# Patient Record
Sex: FEMALE | Race: BLACK OR AFRICAN AMERICAN | NOT HISPANIC OR LATINO | URBAN - METROPOLITAN AREA
[De-identification: names, ages, dates, MRNs, and addresses within clinical notes are randomized per-mention and may not be internally consistent; named-entity substitution may affect disease eponyms.]

---

## 2022-06-22 ENCOUNTER — EMERGENCY (EMERGENCY)
Facility: HOSPITAL | Age: 47
LOS: 0 days | Discharge: HOME | End: 2022-06-22
Attending: STUDENT IN AN ORGANIZED HEALTH CARE EDUCATION/TRAINING PROGRAM | Admitting: STUDENT IN AN ORGANIZED HEALTH CARE EDUCATION/TRAINING PROGRAM
Payer: OTHER MISCELLANEOUS

## 2022-06-22 VITALS
DIASTOLIC BLOOD PRESSURE: 63 MMHG | RESPIRATION RATE: 16 BRPM | TEMPERATURE: 97 F | SYSTOLIC BLOOD PRESSURE: 136 MMHG | WEIGHT: 229.94 LBS | HEART RATE: 64 BPM | OXYGEN SATURATION: 100 %

## 2022-06-22 DIAGNOSIS — M25.561 PAIN IN RIGHT KNEE: ICD-10-CM

## 2022-06-22 DIAGNOSIS — S82.142A DISPLACED BICONDYLAR FRACTURE OF LEFT TIBIA, INITIAL ENCOUNTER FOR CLOSED FRACTURE: ICD-10-CM

## 2022-06-22 DIAGNOSIS — Z88.6 ALLERGY STATUS TO ANALGESIC AGENT: ICD-10-CM
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
## 2022-06-22 DIAGNOSIS — M25.562 PAIN IN LEFT KNEE: ICD-10-CM
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
## 2022-06-22 PROCEDURE — 73700 CT LOWER EXTREMITY W/O DYE: CPT | Mod: 26,50,MA

## 2022-06-22 PROCEDURE — 99285 EMERGENCY DEPT VISIT HI MDM: CPT

## 2022-06-22 PROCEDURE — 73562 X-RAY EXAM OF KNEE 3: CPT | Mod: 26,LT

## 2022-06-22 PROCEDURE — 73590 X-RAY EXAM OF LOWER LEG: CPT | Mod: 26,LT

## 2022-06-22 NOTE — ED PROVIDER NOTE - ATTENDING APP SHARED VISIT CONTRIBUTION OF CARE
47-year-old female with no past medical history presents with knee pain.  Patient fell yesterday with mechanical fall and landed on her knees.  Patient went to urgent care and was informed that she had a fracture in both of her knees and come to the ER for evaluation.  Patient denies hitting any other part of her body.  Patient denies any numbness tingling or any other medical complaints.     VITAL SIGNS: I have reviewed nursing notes and confirm.  CONSTITUTIONAL: non-toxic, well appearing  SKIN: no rash, no petechiae.  EYES: EOMI, pink conjunctiva, anicteric  ENT: tongue midline, no exudates, MMM  NECK: Supple; no meningismus, no JVD  CARD: RRR, no murmurs, equal radial pulses bilaterally 2+  RESP: CTAB, no respiratory distress  ABD: Soft, non-tender, non-distended, no peritoneal signs, no HSM, no CVA tenderness  EXT: Normal ROM x4. No edema. No calves tenderness  NEURO: Alert, oriented. CN2-12 intact, equal strength bilaterally, nl gait.  PSYCH: Cooperative, appropriate. 47-year-old female with no past medical history presents with knee pain.  Patient fell yesterday with mechanical fall and landed on her knees.  Patient went to urgent care and was informed that she had a fracture in both of her knees and come to the ER for evaluation.  Patient denies hitting any other part of her body.  Patient denies any numbness tingling or any other medical complaints.     VITAL SIGNS: I have reviewed nursing notes and confirm.  CONSTITUTIONAL: non-toxic, well appearing  SKIN: no rash, no petechiae.  EYES: EOMI, pink conjunctiva, anicteric  ENT: tongue midline, no exudates, MMM  NECK: Supple; no meningismus, no JVD  RESP: no respiratory distress  EXT: Normal ROM x4. No edema. No calves tenderness  there is a pain to the knee on the L, sensation intact in the lower extremities, DP +2 bilaterally, pt unable to bend knee due to pain.   NEURO: Alert, oriented x3.     a/p  47 yr old f that presents with bilateral knee pain   -imaging  -pain management  -reassess  -dispo pending

## 2022-06-22 NOTE — ED PROVIDER NOTE - NSFOLLOWUPINSTRUCTIONS_ED_ALL_ED_FT
Fracture    A fracture is a break in one of your bones. This can occur from a variety of injuries especially traumatic ones. Symptoms include pain, bruising, or swelling.  A splint might have been applied by your health care provider. Make sure to keep it dry and follow up with an orthopedist as instructed.    SEEK IMMEDIATE MEDICAL CARE IF YOU HAVE THE FOLLOWING SYMPTOMS: numbness, tingling, pain, or weakness in any part of your body distal to the fracture.

## 2022-06-22 NOTE — ED PROVIDER NOTE - OBJECTIVE STATEMENT
pt sent to ED for further eval of poss tib plateau fx. pt reports falling yesterday at work, landing on both knees. pain to both, L>R. went to UC and had xrays which showed L sided abnl ?fx, sent for further eval. pain is sharp, nonradiating, moderate. denies exacerbating or relieving factors. Denies fever/chill/HA/dizziness/chest pain/palpitation/sob/abd pain/n/v/d/ black stool/bloody stool/urinary sxs

## 2022-06-22 NOTE — ED PROVIDER NOTE - PATIENT PORTAL LINK FT
You can access the FollowMyHealth Patient Portal offered by [facility] by registering at the following website: [email or web address]. By joining [email or web address]’s FollowMyHealth portal, you will also be able to view your health information using other applications (apps) compatible with our system.

## 2022-06-22 NOTE — ED ADULT NURSE NOTE - OBJECTIVE STATEMENT
pt sent in er for knee xrays after a fall at work yesterday. S/p fall at work yesterday was told she has a L knee fx and right knee injury that needs more imaging, sent by urgent care.

## 2022-06-22 NOTE — ED ADULT NURSE NOTE - CHIEF COMPLAINT QUOTE
S/p fall at work yesterday was told she has a L knee fx and right knee injury that needs more imaging, sent by urgent care.

## 2022-06-22 NOTE — ED PROVIDER NOTE - PHYSICAL EXAMINATION
CONSTITUTIONAL: Well-appearing; well-nourished; in no apparent distress.   NECK: Supple; non-tender; no cervical lymphadenopathy.  CARDIOVASCULAR: Normal S1, S2; no murmurs, rubs, or gallops.   RESPIRATORY: Normal chest excursion with respiration; breath sounds clear and equal bilaterally; no wheezes, rhonchi, or rales.  GI/: non-distended; non-tender; no palpable organomegaly.   MS: No evidence of trauma or deformity. b/l knee ttp, pain with ROM L knee and swelling L knee; otherwise non-tender to palpation and normal ROM in all other joints/extremities; distal pulses are normal.   SKIN: Normal for age and race; warm; dry; good turgor; no apparent lesions or exudate.   NEURO/PSYCH: A & O x 4; grossly unremarkable. mood and manner are appropriate.

## 2022-06-22 NOTE — ED PROVIDER NOTE - CLINICAL SUMMARY MEDICAL DECISION MAKING FREE TEXT BOX
47 yr old f that presents with bilateral knee pain   -imaging  -pain management  -pt evaluated by orthopedics, recommend knee immobilizer. pt given ortho follow up and strict return precautions.

## 2022-06-22 NOTE — ED PROVIDER NOTE - NS ED ATTENDING STATEMENT MOD
This was a shared visit with the DION. I reviewed and verified the documentation and independently performed the documented:

## 2022-06-22 NOTE — ED ADULT NURSE NOTE - NSIMPLEMENTINTERV_GEN_ALL_ED
Implemented All Fall Risk Interventions:  [place] to call system. Call bell, personal items and telephone within reach. Instruct patient to call for assistance. Room bathroom lighting operational. Non-slip footwear when patient is off stretcher. Physically safe environment: no spills, clutter or unnecessary equipment. Stretcher in lowest position, wheels locked, appropriate side rails in place. Provide visual cue, wrist band, yellow gown, etc. Monitor gait and stability. Monitor for mental status changes and reorient to person, place, and time. Review medications for side effects contributing to fall risk. Reinforce activity limits and safety measures with patient and family.

## 2022-06-22 NOTE — CONSULT NOTE ADULT - SUBJECTIVE AND OBJECTIVE BOX
Orthopaedic Surgery Consult Note      46yo Female presents to [facility] ED with Left tibial plateau sustained yesterday. Patient states yesterday sustained mechanical fall from ground level height with direct impact to bilateral knees.  Patient states she was able to ambulate on bilateral LE s/p injury. Denies headstrike/LOC  Prior to injury, patient full ambulator, patient works for [employer].  Patient denies use of blood thinners.    PMH/PSH  Denies    Home Medications:  Denies      Allergies  Motrin (Unknown)        T(C): 36.1 (06-22-22 @ 17:14), Max: 36.1 (06-22-22 @ 17:14)  HR: 64 (06-22-22 @ 17:14) (64 - 64)  BP: 136/63 (06-22-22 @ 17:14) (136/63 - 136/63)  RR: 16 (06-22-22 @ 17:14) (16 - 16)  SpO2: 100% (06-22-22 @ 17:14) (100% - 100%)    P/E:  AOx3, NAD  Non-labored breathing        LLE  Skin intact  Mild swelling/effusion noted on the medial aspect of L knee   No gross deformity/laceration/abrasion noted  ROM 10-40, limited by pain  Maximal TTP medial aspect L knee  Compartments soft and compressible, no pain w/ passive stretch of digits  SILT sp/dp/t/sural/saph  Firing ta/ehl/fhl/gs  DP pulse 2+, cap refill <2    Imaging:  L knee/tibia; Bilateral LE CT scan demonstrate L minimally displaced tibial plateau fracture.    A/P:  46yo Female w/ left tibial plateau fracture    Weight bearing: NWB LLE  Application of ace/KI  Pain control  Ice/elevation  Crutch training  Follow-up w/ Dr. Hwang, please call [phone] to schedule an appointment.  Prior to discharge, patient requires completion imaging (L Knee, L Tibia/fibula, L ankle)       Orthopaedic Surgery Consult Note      46yo Female presents to [facility] ED as she was told she sustained Left tibial plateau sustained yesterday with imaging acquired at [facility]. Patient states yesterday sustained mechanical fall from ground level height with direct impact to bilateral knees. Patient states she was able to ambulate on bilateral LE s/p injury. Denies headstrike/LOC. Prior to injury, patient full ambulator, patient works for [employer]. Patient denies use of blood thinners.    PMH/PSH  Denies    Home Medications:  Denies      Allergies  Motrin (Unknown)        T(C): 36.1 (06-22-22 @ 17:14), Max: 36.1 (06-22-22 @ 17:14)  HR: 64 (06-22-22 @ 17:14) (64 - 64)  BP: 136/63 (06-22-22 @ 17:14) (136/63 - 136/63)  RR: 16 (06-22-22 @ 17:14) (16 - 16)  SpO2: 100% (06-22-22 @ 17:14) (100% - 100%)    P/E:  AOx3, NAD  Non-labored breathing        LLE  Skin intact  Mild swelling/effusion noted on the medial aspect of L knee   No gross deformity/laceration/abrasion noted  ROM 10-40, limited by pain  Maximal TTP medial aspect L knee  Compartments soft and compressible, no pain w/ passive stretch of digits  SILT sp/dp/t/sural/saph  Firing ta/ehl/fhl/gs  DP pulse 2+, cap refill <2    Imaging:   CT scan demonstrate L minimally displaced tibial spinefracture.    A/P:  46yo Female w/ left tibial spine fracture    Weight bearing: NWB LLE  Application of ace/KI  Pain control  Ice/elevation  Crutch training  Follow-up w/ Dr. Hwang, please call [phone] to schedule an appointment.  Prior to discharge, patient requires completion imaging (L Knee, L Tibia/fibula, L ankle)

## 2022-06-22 NOTE — ED PROVIDER NOTE - CARE PROVIDER_API CALL
Beau Hwang)  Orthopaedic Surgery  [address] [place] [address]  Phone: [phone]  Fax: [phone]  Follow Up Time:

## 2022-06-27 PROBLEM — Z00.00 ENCOUNTER FOR PREVENTIVE HEALTH EXAMINATION: Status: ACTIVE | Noted: 2022-06-27

## 2022-06-28 ENCOUNTER — APPOINTMENT (OUTPATIENT)
Dept: ORTHOPEDIC SURGERY | Facility: CLINIC | Age: 47
End: 2022-06-28

## 2022-06-28 VITALS — WEIGHT: 210 LBS | HEIGHT: 66 IN | BODY MASS INDEX: 33.75 KG/M2

## 2022-06-28 DIAGNOSIS — Z78.9 OTHER SPECIFIED HEALTH STATUS: ICD-10-CM

## 2022-06-28 PROCEDURE — 99204 OFFICE O/P NEW MOD 45 MIN: CPT | Mod: PA

## 2022-06-28 PROCEDURE — 99072 ADDL SUPL MATRL&STAF TM PHE: CPT

## 2022-06-28 NOTE — IMAGING
[de-identified] : On examination of the right and left knee, patient has mild generalized swelling over the knees bilaterally.\par No ecchymosis or erythema notice.\par Patient has significant pain when palpating over the anterior aspect of the knee bilaterally.\par Patient is able to do actively straight leg raise bilaterally with severe pain.\par Patient is able to flex her knee about 45° bilaterally.\par No signs of instability to valgus and varus stress and to anterior drawer bilaterally.\par Motor strength decreased possibly due to pain.\par Calf is soft bilaterally.\par Neurovascular intact bilaterally.\par \par X-rays of the right and left knee were brought by the patient CD and reviewed in office today, x-rays were of poor quality, but they were negative for any acute fracture or dislocation.\par \par \par CT scan of bilateral knee were done at [facility] \par FINDINGS:\par \par RIGHT:\par \par BONES/JOINTS:\par \par No acute displaced fracture of right knee.\par \par Moderate degenerative changes of the medial and lateral tibiofemoral knee compartments with subchondral cystic changes and small osteophytes.\par \par Trace knee suprapatellar joint effusion.\par \par No ankle joint effusion. The ankle mortise is intact.\par \par SOFT TISSUES: No encapsulated hematoma.\par \par LEFT:\par \par BONES/JOINTS:\par \par No acute displaced fracture or dislocation.\par \par Trace knee joint effusion, with posterior knee joint body measuring 0.5 cm.\par \par Mild/moderate tricompartmental osteoarthritis. There is posterior tibial eminence osteophyte.\par \par There is approximately 7 mm of lateral patellar subluxation.\par \par No ankle joint effusion. Ankle mortise is intact.\par \par SOFT TISSUES: No encapsulated hematoma. Subcutaneous edema at the anterior knee.\par \par IMPRESSION:\par \par No acute osseous abnormality.\par \par Bilateral knee osteoarthritis, as above. Chronic 7 mm lateral patellar subluxation on the left.

## 2022-06-28 NOTE — DISCUSSION/SUMMARY
[de-identified] :   At this time impression is contusion to the right and left knee with mild bilateral osteoarthritis as per CT scan, question patella subluxation.\par Patient was advised for compression with a knee support to the knees bilaterally.\par Patient was advised for physical therapy, prescription was given.\par Patient was advised to continue using the knee support.\par Patient was advised to continue doing weight-bearing as tolerated.\par At this time patient is total temporary disabled unable to return to work until further notice.\par Note was given.

## 2022-06-28 NOTE — HISTORY OF PRESENT ILLNESS
[de-identified] :   Patient is here for an evaluation of injury sustained to her knees bilaterally, patient states this injury happened on June 21, 2022, patient states that she works for [employer], she states that she was going to break she was going to her break phone when she is led fell down landing on her knees, patient states that the pain was severe at the time of the injury patient was taken to [facility] where x-rays were done, patient states that she continue having severe pain the following day she went to the emergency room where x-rays and CT scan were done.\par Patient's that she was given Tylenol pain since she is allergic to ibuprofen.\par Patient states that the pain her knees is severe specially over the left knee.\par

## 2022-07-22 ENCOUNTER — APPOINTMENT (OUTPATIENT)
Dept: ORTHOPEDIC SURGERY | Facility: CLINIC | Age: 47
End: 2022-07-22

## 2022-07-29 ENCOUNTER — APPOINTMENT (OUTPATIENT)
Dept: ORTHOPEDIC SURGERY | Facility: CLINIC | Age: 47
End: 2022-07-29

## 2022-07-29 PROCEDURE — 99072 ADDL SUPL MATRL&STAF TM PHE: CPT

## 2022-07-29 PROCEDURE — 99213 OFFICE O/P EST LOW 20 MIN: CPT

## 2022-07-29 NOTE — DISCUSSION/SUMMARY
[de-identified] :   RIGHT KNEE:\par   Patient is having satisfactory improvement to the right knee, patient was advised to do activities as tolerated to the right knee, weight-bearing as tolerated.  \par \par  LEFT KNEE:\par   Since pain and swelling of the left knee continues to be present despite taking Tylenol for pain and physical therapy, patient was advised for an MRI of the left knee to rule out a meniscal tear in the presence solve positive Juan's and constant pain and weakness to her left knee.  \par Patient was advised to continue with therapy per and \par Patient was advised for the use of a hinged knee brace, prescription was given.  \par Patient was advised to continue on out of work, she is total temporary disabled unable to return to work until further notice.  \par \par \par Follow-up in about 4-6 weeks.\par \par Supervising physician Dr. Hwang.\par

## 2022-07-29 NOTE — IMAGING
[de-identified] : Right knee \par   Examination of the right knee, there is no swelling, no ecchymosis, erythema.  \par Patient has full range of motion to flexion-extension of the knee with no end range pain.  \par Good motor strength to knee flexion extension.  \par No signs of instability.  \par Negative Juan's.  \par Patient has nontender the patella tendon or the quadriceps tendon.  \par Patient is able to do active straight leg raise.  \par Patient has  Tenderness to palpation over the medial joint line.  \par Negative patellar grind.  \par Nontender the lateral joint line.\par \par \par Left knee \par  examination to the left knee, patient has some generalized swelling, no joint effusion.  \par Tenderness over the medial joint line on over the femoral condyle on the medial aspect.  \par Patient has weakness to flexion and extension with end range pain.  \par Patient is able to do active straight leg raise.  \par Nontender patella tendon or the quadriceps tendon.  \par Negative patella grind.  \par Negative patellar apprehension.  \par Positive Juan's.  \par No signs of instability.\par \par Calf is soft bilaterally.\par Neurovascular intact bilaterally.\par \par X-rays of the right and left knee were brought by the patient CD and reviewed in office, x-rays were of poor quality, but they were negative for any acute fracture or dislocation.\par \par \par \par

## 2022-07-29 NOTE — HISTORY OF PRESENT ILLNESS
[de-identified] :   Patient is here for a repeat evaluation of injury sustained to her knees bilaterally, patient states this injury happened on June 21, 2022, patient states that she works for [employer], she states that she was going on a break, she was going to the break room when she fell down landing on her knees, patient states that the pain was severe at the time of the injury patient was taken to [facility] where x-rays were done, patient states that she continue having severe pain the following day she went to the emergency room where x-rays and CT scan were done.\par Patient's that she was given Tylenol pain since she is allergic to ibuprofen.\par \par Patient was seen in our office on about June 28, 2022 for evaluation of her knees, at that time I advised for physical therapy, patient states that she has been doing physical therapy to her knees, the right knee has improved, patient has significant pain on her left knee.\par

## 2022-08-02 ENCOUNTER — FORM ENCOUNTER (OUTPATIENT)
Age: 47
End: 2022-08-02

## 2022-08-16 ENCOUNTER — FORM ENCOUNTER (OUTPATIENT)
Age: 47
End: 2022-08-16

## 2022-08-19 ENCOUNTER — APPOINTMENT (OUTPATIENT)
Dept: ORTHOPEDIC SURGERY | Facility: CLINIC | Age: 47
End: 2022-08-19

## 2022-08-19 ENCOUNTER — NON-APPOINTMENT (OUTPATIENT)
Age: 47
End: 2022-08-19

## 2022-08-19 PROCEDURE — 99213 OFFICE O/P EST LOW 20 MIN: CPT | Mod: 25

## 2022-08-19 PROCEDURE — 20610 DRAIN/INJ JOINT/BURSA W/O US: CPT | Mod: LT

## 2022-08-19 PROCEDURE — 99072 ADDL SUPL MATRL&STAF TM PHE: CPT

## 2022-08-19 NOTE — PROCEDURE
[Large Joint Injection] : Large joint injection [Left] : of the left [Knee] : knee [Pain] : pain [Inflammation] : inflammation [___ cc    1%] : Lidocaine ~Vcc of 1%  [___ cc    4mg] : Dexamethasone (Decadron) ~Vcc of 4 mg  [] : Patient tolerated procedure well

## 2022-08-19 NOTE — DISCUSSION/SUMMARY
[de-identified] : At this time impression is improving right knee pain.  \par Left knee pain - contusion  to her left knee.  \par We discussed MRI report.  \par Patient was advised to continue with physical therapy.  \par Patient remains total temporary disabled unable to return to work because she has significant pain with prolonged standing.  \par Patient was advised for a cortisone injection, patient agrees.  \par Cortisones injection was given she has tolerated well.  \par Patient was advised to follow up in 4 weeks for repeat evaluation.\par \par \par Supervising physician Dr. Hwang.\par

## 2022-08-19 NOTE — IMAGING
[de-identified] : Right knee \par   Examination of the right knee, there is no swelling, no ecchymosis, erythema.  \par Patient has full range of motion to flexion-extension of the knee with no end range pain.  \par Good motor strength to knee flexion extension.  \par No signs of instability.  \par Negative Juan's.  \par Patient has nontender the patella tendon or the quadriceps tendon.  \par Patient is able to do active straight leg raise.  \par Patient has  Tenderness to palpation over the medial joint line.  \par Negative patellar grind.  \par Nontender the lateral joint line.\par \par \par Left knee \par  examination to the left knee, patient has some generalized swelling, no joint effusion.  \par Tenderness over the medial joint line on over the femoral condyle on the medial aspect.  \par Patient has weakness to flexion and extension with end range pain.  \par Patient is able to do active straight leg raise.  \par Nontender patella tendon or the quadriceps tendon.  \par Negative patella grind.  \par Negative patellar apprehension.  \par Positive Juan's.  \par No signs of instability.\par \par Calf is soft bilaterally.\par Neurovascular intact bilaterally.\par \par X-rays of the right and left knee were brought by the patient CD and reviewed in office, x-rays were of poor quality, but they were negative for any acute fracture or dislocation.\par \par \par \par

## 2022-08-19 NOTE — HISTORY OF PRESENT ILLNESS
[de-identified] :   Patient is here for a repeat evaluation of injury sustained to her knees bilaterally, patient states this injury happened on June 21, 2022, patient states that she works for [employer], she states that she was going on a break, she was going to the break room when she fell down landing on her knees, patient states that the pain was severe at the time of the injury patient was taken to [facility] where x-rays were done, patient states that she continue having severe pain the following day she went to the emergency room where x-rays and CT scan were done.\par Patient's that she was given Tylenol pain since she is allergic to ibuprofen.\par \par Patient was seen in our office on about June 28, 2022 for evaluation of her knees, at that time I advised for physical therapy, patient states that she has been doing physical therapy to her knees, the right knee has improved, patient has significant pain on her left knee.\par

## 2022-09-09 ENCOUNTER — APPOINTMENT (OUTPATIENT)
Dept: ORTHOPEDIC SURGERY | Facility: CLINIC | Age: 47
End: 2022-09-09

## 2022-09-09 VITALS — WEIGHT: 210 LBS | HEIGHT: 66 IN | BODY MASS INDEX: 33.75 KG/M2

## 2022-09-09 PROCEDURE — 99213 OFFICE O/P EST LOW 20 MIN: CPT | Mod: ACP

## 2022-09-09 PROCEDURE — 99072 ADDL SUPL MATRL&STAF TM PHE: CPT | Mod: ACP

## 2022-09-09 NOTE — DISCUSSION/SUMMARY
[de-identified] :   At this time impression is bilateral knee pain, left worse than right, my impression is left knee pain due to contusion and aggravated patellofemoral osteoarthritis.  \par Patient was advised to continue with physical therapy.  \par I believe that patient will improve with NSAIDs unfortunately she is unable to take NSAIDs since she is allergic to them, patient continue taking Tylenol.  \par Patient would like to return to work, at this time the estimated return to work date will be September 27, 2022.\par Patient continues with moderate temporary disability, patient can return to work with the condition, no standing for more than 60 minutes, preferable I recommend a desk duty.\par \par Follow-up in 4-6 weeks.  \par \par Supervising physician Dr. Hwang.\par

## 2022-09-09 NOTE — IMAGING
[de-identified] : Right knee \par   Examination of the right knee, there is no swelling, no ecchymosis, erythema.  \par Patient has full range of motion to flexion-extension of the knee with no end range pain.  \par Good motor strength to knee flexionand extension.  \par No signs of instability.  \par Negative Juan's.  \par Patient has nontender the patella tendon or the quadriceps tendon.  \par Patient is able to do active straight leg raise.  \par Patient has  tenderness to palpation over the medial joint line.  \par Negative patellar grind.  \par Nontender the lateral joint line.\par \par \par Left knee \par  examination to the left knee, patient has mild generalized swelling, no joint effusion.  \par Tenderness over the medial joint line on over the femoral condyle on the medial aspect.  \par Patient has weakness to flexion and extension with end range pain.  \par Patient is able to do active straight leg raise.  \par Nontender patella tendon or the quadriceps tendon.  \par Negative patella grind.  \par Positive patellar apprehension.  \par Tenderness over the patellar facets.\par Positive Juan's.  \par No signs of instability.\par \par Calf is soft bilaterally.\par Neurovascular intact bilaterally.\par \par \par \par \par \par

## 2022-09-09 NOTE — HISTORY OF PRESENT ILLNESS
[de-identified] :   Patient is here for a repeat evaluation of injury sustained to her knees bilaterally, patient states this injury happened on June 21, 2022, patient states that she works for [employer], she states that she was going on a break, she was going to the break room when she fell down landing on her knees, patient states that the pain was severe at the time of the injury patient was taken to [facility] where x-rays were done, \par \par Patient was seen in our office on about August 19, 2022 for evaluation of her knees.\par   Patient admits to improvement of symptoms, but still has pain especially when standing for prolonged period time.  \par Patient states that she felt best when she was going for physical therapy and when using KT taping to her knees.  \par Patient states that the left is worse than the right.\par

## 2022-10-14 ENCOUNTER — APPOINTMENT (OUTPATIENT)
Dept: ORTHOPEDIC SURGERY | Facility: CLINIC | Age: 47
End: 2022-10-14

## 2022-10-14 ENCOUNTER — NON-APPOINTMENT (OUTPATIENT)
Age: 47
End: 2022-10-14

## 2022-10-14 PROCEDURE — 99072 ADDL SUPL MATRL&STAF TM PHE: CPT | Mod: ACP

## 2022-10-14 PROCEDURE — 99213 OFFICE O/P EST LOW 20 MIN: CPT | Mod: ACP

## 2022-10-14 NOTE — HISTORY OF PRESENT ILLNESS
[de-identified] :   Patient is here for a repeat evaluation of injury sustained to her knees bilaterally, patient states this injury happened on June 21, 2022, patient states that she works for [employer], she states that she was going on a break as she was walking to the break room  she fell down landing on her knees, patient states that the pain was severe at the time of the injury patient was taken to [facility] where x-rays were done, \par \par  Patient states that her right knee is improving, the pain on her left knee still quite significant, patient states that she noticed that physical therapy and KT taping was helping with her left knee, unfortunately her physical therapy was not yet approved by worker's compensation, appears to be that the is problem was due to add and internal issue in our office.  \par Patient would like to return to work with accommodations, patient states that she had contact her employer, day has not been any job with the accommodations that she requires therefore she is not working at the present time.  \par Patient states that she was seen by an independent physician who recommended physical therapy.

## 2022-10-14 NOTE — WORK
[Moderate Partial] : moderate partial [Can return to work without limitations on ______] : can return to work without limitations on [unfilled] [Bending/Twisting] : bending/twisting [Climbing stairs/Ladders] : climbing stairs/ladders [Kneeling] : kneeling [Lifting] : lifting [Walking] : walking [Pulling/Pushing] : pulling/pushing [Standing] : standing [Unknown at this time] : : unknown at this time [Patient] : patient [No Rx restrictions] : No Rx restrictions. [I provided the services listed above] :  I provided the services listed above. [FreeTextEntry1] :   Good [FreeTextEntry3] : Erica Spears

## 2022-10-14 NOTE — DISCUSSION/SUMMARY
[de-identified] :  IMPRESSION:\par  bilateral knee pain, left worse than right.  \par Left knee pain due to bone contusion to the medial aspect of the knee and aggravated patellofemoral chondromalacia.\par \par PLAN:  Patient was advised to continue with physical therapy, and new prescription was given, instruction were given to the staff to be sending physical therapy for authorization for \par We discussed cortisone injection, patient had the last cortisone injection on August 2022, I believe that cortisone injection only helped her so we can give for another cortisone injection possibly next month.  \par Patient was advised  for the use of a PTO knee brace, prescription was given.  \par Patient has a moderate temporary disability, patient was given a note to return to work with restrictions, such as no pushing, no pulling no lifting more than 15 lb.  \par Avoid stair climbing.  \par No standing for more than 3 hours breaks should be allowed.  \par \par \par FOLLOW-UP:Four weeks for repeat evaluation\par \par SUPERVISING PHYSICIAN DR. COTTRELL

## 2022-10-14 NOTE — IMAGING
[de-identified] : Right knee \par   Examination of the right knee, there is no swelling, no ecchymosis, erythema.  \par Patient has full range of motion to flexion-extension of the knee with no end range pain.  \par Good motor strength to knee flexionand extension.  \par No signs of instability.  \par Negative Juan's.  \par Patient has nontender the patella tendon or the quadriceps tendon.  \par Patient is able to do active straight leg raise.  \par Patient has  tenderness to palpation over the medial joint line.  \par Negative patellar grind.  \par Nontender the lateral joint line.\par \par \par Left knee \par  Examination to the left knee, patient has mild generalized swelling, no joint effusion.  \par Tenderness over the medial joint line on over the femoral condyle on the medial aspect.  \par Patient has weakness to flexion and extension with end range pain.  \par Patient is able to do active straight leg raise.  \par Nontender patella tendon or the quadriceps tendon.  \par Positive patella grind.  \par Positive patellar apprehension.  \par Tenderness over the patellar facets.\par Equivocal Juan's.  \par No signs of instability.\par Patellar maltracking.  \par Patient has crepitance with flexion-extension of the knee.\par \par Calf is soft bilaterally.\par Neurovascular intact bilaterally.\par \par   MRI of the left knee done on August 5, 2022 shows the following:  No evidence of acute meniscal or ligament tear.  moderate patchy bone marrow edema in the anterolateral aspects of the proximal tibia suggesting above contusion.  \par Moderate to severe patellofemoral degenerative changes with lateral patellar translation.\par \par \par \par

## 2022-10-18 ENCOUNTER — FORM ENCOUNTER (OUTPATIENT)
Age: 47
End: 2022-10-18

## 2023-01-23 ENCOUNTER — FORM ENCOUNTER (OUTPATIENT)
Age: 48
End: 2023-01-23

## 2023-02-07 ENCOUNTER — APPOINTMENT (OUTPATIENT)
Dept: ORTHOPEDIC SURGERY | Facility: CLINIC | Age: 48
End: 2023-02-07
Payer: OTHER MISCELLANEOUS

## 2023-02-07 DIAGNOSIS — S89.92XA UNSPECIFIED INJURY OF LEFT LOWER LEG, INITIAL ENCOUNTER: ICD-10-CM

## 2023-02-07 PROCEDURE — 99213 OFFICE O/P EST LOW 20 MIN: CPT | Mod: ACP,25

## 2023-02-07 PROCEDURE — 99072 ADDL SUPL MATRL&STAF TM PHE: CPT

## 2023-02-07 PROCEDURE — 73562 X-RAY EXAM OF KNEE 3: CPT | Mod: 50

## 2023-02-07 PROCEDURE — 20610 DRAIN/INJ JOINT/BURSA W/O US: CPT | Mod: RT

## 2023-02-07 RX ORDER — HYALURONATE SODIUM 20 MG/2 ML
20 SYRINGE (ML) INTRAARTICULAR
Qty: 2 | Refills: 0 | Status: ACTIVE | OUTPATIENT
Start: 2023-02-07

## 2023-02-07 NOTE — IMAGING
[de-identified] : Examination of the right and left knee has generalized synovial thickening and mild swelling bilaterally, patient has painful range of motion to her knees bilaterally.\par Patient has significant tenderness over the medial joint line bilaterally.\par Good motor strength with knee flexion and knee extension bilaterally.\par Positive patellar grind bilaterally.\par No signs of instability to her knees bilaterally.\par Mild antalgic gait bilaterally.\par Neurovascular intact.\par \par X-ray of the right and left knee were done in office today, the x-ray of the right knee shows severe osteoarthritis especially over the medial compartment.  X-ray of the left knee is negative for any acute fracture or dislocation, moderate narrowing space tricompartmental.\par

## 2023-02-07 NOTE — DISCUSSION/SUMMARY
[de-identified] : Impression: Bilateral knee pain possibly due to aggravated osteoarthritis due to bilateral knee injury.\par \par Plan: Patient was advised for cortisone injection of her knees bilaterally.\par Strongly advised to continue with physical therapy and use an knee brace.\par Patient remains still on temporary disability unable to return to work until further notice with accommodations.\par \par Risk and benefits of cortisone injection were discussed with the patient, after verbal consent knee was prepped with alcohol and Betadine.\par Cortisone injection to the bilateral knees was given using a sterile technique.\par Ethyl chloride was used as an anesthetic.  \par Injection was given using the inferior lateral portal.\par 10 mg of dexamethasone and 50 mg of 2% lidocaine were given.\par Patient tolerated injection well.\par Patient was advised that if any redness, or increased pain to give a call to the office.\par Patient is aware that on the 3 cortisone injections a year are allowed.\par \par At this time I also recommend for viscosupplementation injection to her knees bilaterally to help her with the aggravated osteoarthritis to her knees bilaterally.\par \par Follow-up: 6 weeks with Dr. Mitchell\par \par Supervising physician Dr. Hwang

## 2023-02-07 NOTE — HISTORY OF PRESENT ILLNESS
[de-identified] : 48-year-old female here for repeat evaluation of the same to her knees bilaterally, this injury happened on June 21, 2022 while at work.\par Patient states that the time of the injury she was working for [employer].\par Patient stated at the time of the injury she was seen at urgent center where x-rays were done, patient has been follow-up in the office since June 28, 2022, patient has been treated conservatively with physical therapy.\par Patient stated that she has been having difficulty getting the physical therapy approved, and she still has severe pain.  Patient states that at this time the pain on her right and left knee is quite severe.\par Patient had an MRI to her left knee on August 5, 2022 these MRI showed no evidence of acute meniscal or ligament tear.  Moderate patchy bone marrow edema in the anterior lateral aspect of the proximal tibia suggesting a bone contusion.  Moderate to severe patellofemoral degenerative changes with lateral patellar translation.\par \par As per patient, patient was trying to return to work with accommodations, she states that her job is unable to provide these accommodations.\par \par Since patient is severe pain today, x-ray of bilateral knee was repeated.

## 2023-02-17 ENCOUNTER — NON-APPOINTMENT (OUTPATIENT)
Age: 48
End: 2023-02-17

## 2023-02-17 ENCOUNTER — APPOINTMENT (OUTPATIENT)
Dept: ORTHOPEDIC SURGERY | Facility: CLINIC | Age: 48
End: 2023-02-17
Payer: OTHER MISCELLANEOUS

## 2023-02-17 PROCEDURE — 99072 ADDL SUPL MATRL&STAF TM PHE: CPT

## 2023-02-17 PROCEDURE — 99213 OFFICE O/P EST LOW 20 MIN: CPT

## 2023-02-18 NOTE — REASON FOR VISIT
[FreeTextEntry2] : repeat evaluation of injury sustained to her knees bilaterally, patient states this injury happened on June 21, 2022 At work for [employer] did not return has had x-rays and MRI knees bother left much more than right with some swelling only was provided 4 weeks of therapy has had 2 cortisone injections stairs are a problem difficulty sleeping try some ibuprofen MRI left knee 08/05/2022 show patellofemoral changes

## 2023-02-18 NOTE — DISCUSSION/SUMMARY
[de-identified] : Impression: Bilateral knee pain possibly due to aggravated osteoarthritis due to bilateral knee injury. L>R\par At this time I also recommend for viscosupplementation injection to  both knees and to restart physical therapy for both knees the knee braces are appropriate at this time she could do sedentary work I find that she has 75% temporary disabled note provided that she could work with a combination continue with ibuprofen  in my medical opinion she has not reached MMI\par  I will see her back in a few months\par \par

## 2023-02-18 NOTE — IMAGING
[de-identified] : Examination of the right and left knee has generalized synovial thickening and mild swelling bilaterally, patient has painful range of motion to her knees bilaterally.\par Patient has significant tenderness over the medial joint line bilaterally.\par Good motor strength with knee flexion and knee extension bilaterally.\par Positive patellar grind bilaterally.\par No signs of instability to her knees bilaterally.\par Mild antalgic gait bilaterally.\par Neurovascular intact.\par \par X-ray of the right and left knee were done in office today, the x-ray of the right knee shows severe osteoarthritis especially over the medial compartment.  X-ray of the left knee is negative for any acute fracture or dislocation, moderate narrowing space tricompartmental.\par

## 2023-02-18 NOTE — HISTORY OF PRESENT ILLNESS
[de-identified] : 48-year-old female here for repeat evaluation of the same to her knees bilaterally, this injury happened on June 21, 2022 while at work.\par Patient states that the time of the injury she was working for [employer].\par Patient stated at the time of the injury she was seen at urgent center where x-rays were done, patient has been follow-up in the office since June 28, 2022, patient has been treated conservatively with physical therapy.\par Patient stated that she has been having difficulty getting the physical therapy approved, and she still has severe pain.  Patient states that at this time the pain on her right and left knee is quite severe.\par Patient had an MRI to her left knee on August 5, 2022 these MRI showed no evidence of acute meniscal or ligament tear.  Moderate patchy bone marrow edema in the anterior lateral aspect of the proximal tibia suggesting a bone contusion.  Moderate to severe patellofemoral degenerative changes with lateral patellar translation.\par \par As per patient, patient was trying to return to work with accommodations, she states that her job is unable to provide these accommodations.\par \par Since patient is severe pain today, x-ray of bilateral knee was repeated.

## 2023-03-14 ENCOUNTER — APPOINTMENT (OUTPATIENT)
Dept: ORTHOPEDIC SURGERY | Facility: CLINIC | Age: 48
End: 2023-03-14

## 2023-03-14 ENCOUNTER — FORM ENCOUNTER (OUTPATIENT)
Age: 48
End: 2023-03-14

## 2023-03-17 ENCOUNTER — APPOINTMENT (OUTPATIENT)
Dept: ORTHOPEDIC SURGERY | Facility: CLINIC | Age: 48
End: 2023-03-17
Payer: OTHER MISCELLANEOUS

## 2023-03-17 PROCEDURE — 99072 ADDL SUPL MATRL&STAF TM PHE: CPT

## 2023-03-17 PROCEDURE — 20610 DRAIN/INJ JOINT/BURSA W/O US: CPT | Mod: RT

## 2023-03-17 PROCEDURE — 99213 OFFICE O/P EST LOW 20 MIN: CPT | Mod: 25

## 2023-03-17 NOTE — IMAGING
[de-identified] : Examination of the right and left knee has generalized synovial thickening and mild swelling bilaterally, patient has painful range of motion to her knees bilaterally.\par Patient has significant tenderness over the medial joint line bilaterally.\par Good motor strength with knee flexion and knee extension bilaterally.\par Positive patellar grind bilaterally.\par No signs of instability to her knees bilaterally.\par Mild antalgic gait bilaterally.\par Neurovascular intact.\par \par X-ray of the right and left knee were done in office today, the x-ray of the right knee shows severe osteoarthritis especially over the medial compartment.  X-ray of the left knee is negative for any acute fracture or dislocation, moderate narrowing space tricompartmental.\par

## 2023-03-17 NOTE — HISTORY OF PRESENT ILLNESS
[de-identified] : 48-year-old female here for repeat evaluation of the same to her knees bilaterally, this injury happened on June 21, 2022 while at work.\par Patient states that the time of the injury she was working for [employer].\par Patient stated at the time of the injury she was seen at urgent center where x-rays were done, patient has been follow-up in the office since June 28, 2022, patient has been treated conservatively with physical therapy.\par Patient stated that she has been having difficulty getting the physical therapy approved, and she still has severe pain.  Patient states that at this time the pain on her right and left knee is quite severe.\par Patient had an MRI to her left knee on August 5, 2022 these MRI showed no evidence of acute meniscal or ligament tear.  Moderate patchy bone marrow edema in the anterior lateral aspect of the proximal tibia suggesting a bone contusion.  Moderate to severe patellofemoral degenerative changes with lateral patellar translation.\par \par As per patient, patient was trying to return to work with accommodations, she states that her job is unable to provide these accommodations.\par \par Since patient is severe pain today, x-ray of bilateral knee was repeated.

## 2023-03-17 NOTE — ASSESSMENT
[FreeTextEntry1] :  symptoms in both knees still present we discussed today gel injection knees\par I  discussed with patient today the  option of a  Visco supplement injection to  both knees.  risks and benefits were  reviewed a  consent was  given, Synvisc-One  with sterile technique through a lateral approach the gel injection was delivered and  tolerated well. a Band-Aid was applied\par

## 2023-04-09 ENCOUNTER — FORM ENCOUNTER (OUTPATIENT)
Age: 48
End: 2023-04-09

## 2023-04-10 ENCOUNTER — FORM ENCOUNTER (OUTPATIENT)
Age: 48
End: 2023-04-10

## 2023-04-12 ENCOUNTER — FORM ENCOUNTER (OUTPATIENT)
Age: 48
End: 2023-04-12

## 2023-04-18 ENCOUNTER — APPOINTMENT (OUTPATIENT)
Dept: ORTHOPEDIC SURGERY | Facility: CLINIC | Age: 48
End: 2023-04-18

## 2023-05-19 ENCOUNTER — APPOINTMENT (OUTPATIENT)
Dept: ORTHOPEDIC SURGERY | Facility: CLINIC | Age: 48
End: 2023-05-19
Payer: OTHER MISCELLANEOUS

## 2023-05-19 PROCEDURE — 99213 OFFICE O/P EST LOW 20 MIN: CPT

## 2023-05-19 NOTE — ASSESSMENT
[FreeTextEntry1] :  symptoms in both knees still present\par  gel was not very helpful she reports that the taping helped I think  physical therapy should resume I am going to put her on Mobic as an anti-inflammatory\par  she could work with accommodation do believe it knee braces would help she certainly could do something sedentary she has not reached MMI I will see her in a couple months

## 2023-05-19 NOTE — HISTORY OF PRESENT ILLNESS
[de-identified] : 48-year-old female here for repeat evaluation of the same to her knees bilaterally, this injury happened on June 21, 2022 while at work.\par Patient states that the time of the injury she was working for [employer].\par Patient stated at the time of the injury she was seen at urgent center where x-rays were done, patient has been follow-up in the office since June 28, 2022, patient has been treated conservatively with physical therapy.\par Patient stated that she has been having difficulty getting the physical therapy approved, and she still has severe pain.  Patient states that at this time the pain on her right and left knee is quite severe.\par Patient had an MRI to her left knee on August 5, 2022 these MRI showed no evidence of acute meniscal or ligament tear.  Moderate patchy bone marrow edema in the anterior lateral aspect of the proximal tibia suggesting a bone contusion.  Moderate to severe patellofemoral degenerative changes with lateral patellar translation.\par \par As per patient, patient was trying to return to work with accommodations, she states that her job is unable to provide these accommodations.\par \par Since patient is severe pain today, x-ray of bilateral knee was repeated.

## 2023-05-19 NOTE — IMAGING
[de-identified] : Examination of the right and left knee has generalized synovial thickening and mild swelling bilaterally, patient has painful range of motion to her knees bilaterally.\par Patient has significant tenderness over the medial joint line bilaterally.\par Good motor strength with knee flexion and knee extension bilaterally.\par Positive patellar grind bilaterally.\par No signs of instability to her knees bilaterally.\par Mild antalgic gait bilaterally.\par Neurovascular intact.\par \par X-ray of the right and left knee were done in office today, the x-ray of the right knee shows severe osteoarthritis especially over the medial compartment.  X-ray of the left knee is negative for any acute fracture or dislocation, moderate narrowing space tricompartmental.\par

## 2023-05-19 NOTE — REASON FOR VISIT
[FreeTextEntry2] : follow up for bilateral knee pain\par Had bilateral gel injections last visit [id number] did not work much left knee bothers her more than right she still not working therapy is helping but no longer provided the taping did help ibuprofen is not been helping nor has Tylenol

## 2023-05-25 ENCOUNTER — APPOINTMENT (OUTPATIENT)
Dept: ORTHOPEDIC SURGERY | Facility: CLINIC | Age: 48
End: 2023-05-25
Payer: OTHER MISCELLANEOUS

## 2023-05-25 PROCEDURE — 99075 MEDICAL TESTIMONY: CPT

## 2023-08-16 ENCOUNTER — APPOINTMENT (OUTPATIENT)
Dept: ORTHOPEDIC SURGERY | Facility: CLINIC | Age: 48
End: 2023-08-16
Payer: OTHER MISCELLANEOUS

## 2023-08-16 PROCEDURE — 99213 OFFICE O/P EST LOW 20 MIN: CPT

## 2023-09-07 NOTE — IMAGING
[de-identified] : Examination of the right and left knee has generalized synovial thickening and mild swelling bilaterally, patient has painful range of motion to her knees bilaterally.\par  Patient has significant tenderness over the medial joint line bilaterally.\par  Good motor strength with knee flexion and knee extension bilaterally.\par  Positive patellar grind bilaterally.\par  No signs of instability to her knees bilaterally.\par  Mild antalgic gait bilaterally.\par  Neurovascular intact.\par  \par  X-ray of the right and left knee were done in office today, the x-ray of the right knee shows severe osteoarthritis especially over the medial compartment.  X-ray of the left knee is negative for any acute fracture or dislocation, moderate narrowing space tricompartmental.\par

## 2023-09-07 NOTE — HISTORY OF PRESENT ILLNESS
[de-identified] : 48-year-old female here for repeat evaluation of the same to her knees bilaterally, this injury happened on June 21, 2022 while at work.\par  Patient states that the time of the injury she was working for [employer].\par  Patient stated at the time of the injury she was seen at urgent center where x-rays were done, patient has been follow-up in the office since June 28, 2022, patient has been treated conservatively with physical therapy.\par  Patient stated that she has been having difficulty getting the physical therapy approved, and she still has severe pain.  Patient states that at this time the pain on her right and left knee is quite severe.\par  Patient had an MRI to her left knee on August 5, 2022 these MRI showed no evidence of acute meniscal or ligament tear.  Moderate patchy bone marrow edema in the anterior lateral aspect of the proximal tibia suggesting a bone contusion.  Moderate to severe patellofemoral degenerative changes with lateral patellar translation.\par  \par  As per patient, patient was trying to return to work with accommodations, she states that her job is unable to provide these accommodations.\par  \par  Since patient is severe pain today, x-ray of bilateral knee was repeated.

## 2023-09-07 NOTE — REASON FOR VISIT
[FreeTextEntry2] : Patient is coming in for a follow up WC DOI 06/21/2022 for bilateral knees therapy still not approved is using the Mobic helps

## 2023-09-07 NOTE — ASSESSMENT
[FreeTextEntry1] :  symptoms in both knees still present  gel was not very helpful she reports that the taping helped I think  physical therapy should resume I am going to continue her on Mobic as an anti-inflammatory  she could work with accommodation 6 hours a day with accommodations and intermittent breaks do believe that began mid-September knee braces would help she certainly could do something sedentary she has not reached MMI I will see her in a couple months Addendum 9/7/23: the patient has a moderate partial temporary disability

## 2023-10-18 ENCOUNTER — APPOINTMENT (OUTPATIENT)
Dept: ORTHOPEDIC SURGERY | Facility: CLINIC | Age: 48
End: 2023-10-18
Payer: OTHER MISCELLANEOUS

## 2023-10-18 DIAGNOSIS — M25.562 PAIN IN RIGHT KNEE: ICD-10-CM

## 2023-10-18 DIAGNOSIS — M25.561 PAIN IN RIGHT KNEE: ICD-10-CM

## 2023-10-18 DIAGNOSIS — M17.2 BILATERAL POST-TRAUMATIC OSTEOARTHRITIS OF KNEE: ICD-10-CM

## 2023-10-18 PROCEDURE — 99213 OFFICE O/P EST LOW 20 MIN: CPT

## 2023-10-18 RX ORDER — MELOXICAM 15 MG/1
15 TABLET ORAL
Qty: 30 | Refills: 2 | Status: ACTIVE | COMMUNITY
Start: 2023-05-19 | End: 1900-01-01

## 2023-11-09 ENCOUNTER — APPOINTMENT (OUTPATIENT)
Dept: ORTHOPEDIC SURGERY | Facility: CLINIC | Age: 48
End: 2023-11-09

## 2023-12-18 ENCOUNTER — APPOINTMENT (OUTPATIENT)
Dept: ORTHOPEDIC SURGERY | Facility: CLINIC | Age: 48
End: 2023-12-18